# Patient Record
Sex: FEMALE | Race: WHITE
[De-identification: names, ages, dates, MRNs, and addresses within clinical notes are randomized per-mention and may not be internally consistent; named-entity substitution may affect disease eponyms.]

---

## 2020-02-07 NOTE — NUR
PT WAS AN ER ADMIT. PT WAS ADMITTED AT ABOUT 2000. SPOUSE PRESENT AT BEDSIDE
AT THIS THE TIME OF ARRIVAL. PT IS ALERT AND ORIENTED WITH NO SIGN OF DISTRESS
NOTED IN PT. DENIES ANY PAIN. PT IS ON OXYGEN. ADMISSION ASSESSMENT, DATA AND
EDUCATION COMPLETED. PT IS STABLE. PT WANTED CODE STATUS CHANGED FROM A FULL
CODE TO A DNR. NP PRACTITONER NOTIFIED AND STATUS CHANGED. SCHEDULED MEDS
ADMINISTERED TO PATIENT. MONITOR BREATHING AND OXYGENTAION. DENIES ANY FURTHER
NEEDS AT TIME.

## 2020-02-07 NOTE — NUR
PT CARE ASSUMED APPROXIMATELY 0700. PT ASSESSMENTS AS CHARTED. PT DENIES PAIN.
PT MEDICATION AS CHARTED. PT WALKED WITH SPOUSE IN HALLWAY WITHOUT
DIFFICULTY. PT REMAINS ON 5 LPM NC OXYGEN.

## 2020-02-08 NOTE — NUR
ASSESSMENT AS DOCUMENTED.PT BEEN RESTING IN NO ACUTE DISTRESS.A/OX4.VSS.ON O2
AT 5LITERS PNC.MARMOLEJO NOTED WITH ACTIVITIES.IVF.VOIDING ADEQUATELY.IV
ANTIBIOTICS.DENIES PAIN.POC IS TO CONTINUE WITH CURRENT TX.

## 2020-02-08 NOTE — NUR
ASSUMED CARE OF PT AT SHIFT CHANGE. ASSESSMENTS AS CHARTED. MEDS GIVEN PER
MAR. VSS. NO C/O PAIN. PT ON 4L NC WITH NO C/O SOA. PT HAS 1:1 SITTER D/T
SUICICAL COMMENTS MADE TO DR. RUSSELL. DAUGHTER AND  HAVE ALTERNATED AT
BEDSIDE. WILL CONTINUE TO MONITOR.

## 2020-02-09 NOTE — NUR
ASSUMED CARE OF PT AS SHIFT CHANGE. ASSESSMENTS AS CHARTED. MEDS GIVEN PER
MAR. PT A&OX4 NO C/O PAIN OR SOA. ON 1L NC. WALKED IN HALLS WITH NO
DE-SATTING. TAPERING STEROIDS. WILL CONTINUE TO MONITOR AND FOLLOW POC.

## 2020-02-09 NOTE — NUR
2140 HEART RATE INCREASED  THEN AFTER 2 MINS PT CHANGED TO AFIB RVR RATE
144, S ALARAB NP NOTIFED ORDER RCEIVED FRO PO LOPRESSOR. PT /33 DNIES
SOA OR CHEST PAIN , PT REQUESTUNG HER SLEEPING PILL. STAT EKG DONE, SHOWS AFIB
124.

## 2020-02-09 NOTE — NUR
ASSUMED PT CARE AT 1900. VSS. PT A&04. SITTER IN ROOM. ROOM SCRUBED; SI
PRECAUTIONS MAINTAINED. CHANGED PT'S DIET TO A BEHAVIORAL HEALTH TRAY. PSYCH
CONSULT CALLED AND MESSAGE LEFT WITH ANSWERING SERVICE; PROVIDER YET TO SEE
PT. CASE MANAGEMENT CONSULT MADE. PT IN TEARS, STATED "I FEEL LIKE A FAILURE,
BECAUSE I STARTED SMOKING AGAIN AFTER I INITIALLY QUIT" I PROVIDED SMOKING
CEASATION EDUCATION TO PT, AND ALSO, IMPORTANT SELF CARE WHILE GOING THROUGH
THE PROCESS; ALSO WAYS IN WHICH SHE COULD STAY BUSY BY SPENDING MORE TIME
WITH HER GRANDKIDS SINCE PT STATED THEY BRING HER MIGUEL. PT AGREED AND
PROMISED TO WORK ON HER HEALTH JOURNEY.

## 2020-02-10 NOTE — NUR
PT RESTING WELL THROUGHOUT HOURLY ROUNDS HEART RATE 88 NSR  STILL WITH 1:1
SITTER  AND DAUGHTER AT BEDSIDE. WILL CONINTUE WITH CURRENT PLAN OF CARE AND
WILL REPORT CHANGES OR ABNORMAL FINDINGS.

## 2020-02-10 NOTE — 2DMMODE
[address]   [address]                   2 D/M-MODE ECHOCARDIOGRAM     
_______________________________________________________________________________
 
Name:       SAMMIE ROSA          Room #:         213-P       ADM IN  
M.R.#:      [id number]                       Account #:      [id number]  
Admission:  20    Attend Phys:    Luis Eduardo Hernandez MD    
Discharge:              Date of Birth:  [birth year]  
                                                          Report #: [id number]
                                                                    [id number]
_______________________________________________________________________________
THIS REPORT FOR:  
 
cc:  Laura Becerra MD, Paula V. MD Mancuso,Domingo OSEI MD [facility]                                        
                                                                       ~
 
--------------- APPROVED REPORT --------------
 
 
Study performed:  02/10/2020 13:45:02
 
EXAM: Comprehensive 2D, Doppler, and color-flow 
Echocardiogram 
Patient Location: Bedside   
Room #:  213     Status:  routine
 
      BSA:         2.03
HR: 70 bpm BP:          127/57 mmHg 
Rhythm: NSR     
 
Other Information 
Study Quality: Good
 
Indications
COPD
Diabetes
Atrial Fibrillation
Dyspnea 
Hypertension/HDD
 
2D Dimensions
 IVC:  15.00 mm
 
Volumes
Left Atrial Volume (Systole) 
Single Plane 4CH:  61.71 mL Single Plane 2CH:  44.05 mL
    LA ESV Index:  28.00 mL/m2
 
Aortic Valve
AoV Peak Charanjit.:  1.98 m/s 
AO Peak Gr.:  15.60 mmHg LVOT Max P.97 mmHg
    LVOT Max V:  1.12 m/s
 
Mitral Valve
    E/A Ratio:  1.3
 
 
[address] [place]  [address]
Phone:  [phone] 2 D/M-MODE ECHOCARDIOGRAM     
_______________________________________________________________________________
 
Name:            SAMMIE ROSA          Room #:        213-P       [facility] [place]
M.R.#:           [id number]          Account #:     [id number]  
Admission:       20         Attend Phys:   Luis Eduardo Hernandez MD
Discharge:                  Date of Birth: [birth year]  
                         Report #:      [id number]
        [id number]
_______________________________________________________________________________
    MV Decel. Time:  258.60 ms
MV E Max Charanjit.:  1.12 m/s 
MV A Charanjit.:  0.83 m/s  
MV PHT:  74.99 ms  
IVRT:  78.43 ms   
 
Pulmonary Valve
PV Peak Charanjit.:  1.06 m/s PV Peak Gr.:  4.52 mmHg
 
Pulmonary Vein
P Vein S:    0.63 m/s P Vein A:  0.30 m/s
P Vein D:   0.32 m/s P Vein A Dur.:  110.7 msec
P Vein S/D Ratio:  1.97 
 
Tricuspid Valve
TR Peak Charanjit.:  2.24 m/s  
TR Peak Gr.:  20.15 mmHg 
    PA Pressure:  25.00 mmHg
 
Left Ventricle
The left ventricle is normal size. There is normal LV segmental wall 
motion. There is normal left ventricular wall thickness. Left 
ventricular systolic function is hyperdynamic. Mildly increased LV 
gradient with a peak of 22 mmHg. LVEF is >70%. The left 
ventricular diastolic function is normal.
 
Right Ventricle
The right ventricle is normal size. The right ventricular systolic 
function is normal.
 
Atria
The left atrium size is normal. The right atrium size is 
normal.
 
Aortic Valve
The aortic valve is normal in structure. No aortic regurgitation is 
present. There is no aortic valvular stenosis.
 
Mitral Valve
The mitral valve is normal in structure. Trace to mild mitral 
regurgitation. No evidence of mitral valve stenosis.
 
Tricuspid Valve
The tricuspid valve is normal in structure. There is trace tricuspid 
regurgitation. Estimated PAP 25 mmHg. There is no pulmonary 
hypertension.
 
 
[facility]
[address]
[place]  [address]
Phone:  [phone]                    2 D/M-MODE ECHOCARDIOGRAM     
_______________________________________________________________________________
 
Name:            SAMMIE ROSA          Room #:        213-P       ADM IN
M.R.#:           [id number]          Account #:     [id number]  
Admission:       20         Attend Phys:   Luis Eduardo Hernandez MD
Discharge:                  Date of Birth: [birth year]  
                         Report #:      [id number]
        [id number]
_______________________________________________________________________________
 
Pulmonic Valve
The pulmonary valve is normal in structure. There is no pulmonic 
valvular regurgitation.
 
Great Vessels
The aortic root is normal in size. IVC is normal in size and 
collapses >50% with inspiration.
 
Pericardium
There is no pericardial effusion.
 
<Conclusion>
The left ventricle is normal size.
Left ventricular systolic function is hyperdynamic. Mildly increased 
LV gradient with a peak of 22 mmHg.
LVEF is >70%.
The left ventricular diastolic function is normal.
The right ventricle is normal size.
The left atrium size is normal.
The aortic valve is normal in structure.
Trace to mild mitral regurgitation.
There is trace tricuspid regurgitation. Estimated PAP 25 mmHg.
There is no pulmonary hypertension.
The aortic root is normal in size.
There is no pericardial effusion.
 
 
 
 
 
 
 
 
 
 
 
 
 
 
 
 
 
 
  <ELECTRONICALLY SIGNED>
   By: Domingo Cordon MD, FACC   
  02/10/20     144
D: 02/10/20 1441                           _____________________________________
T: 02/10/20 1441                           Domingo Cordon MD, FACC     /INF

## 2020-02-10 NOTE — NUR
ASSESSMENT AS CHARTED. PT ALERT AND ORIENTED. VSS. DENIED HAVING PAIN OR
DISCOMFORT. SEEN BY DR. RUIZ. SHAHRIAR D/C. NO CARDIAC OR RESPIRATORY DISTRESS
NOTED. WILL CONTINUE TO MONITOR.

## 2020-02-10 NOTE — NUR
HEART RATE 94 NSR PT RESTING QUIETLY IN BED 1:1 SITTER AT BEDSIDE WITH
DAUGHTER AT BEDSIDE. WILL CONTINUE TO MONITOR AND REPORT CHANGES.

## 2020-02-11 NOTE — NUR
ASSESSMENT AS CHARTED. PT ALERT AND ORIENTED. VSS. DENIED HAVING PAIN OR
DISCOMFORT. SEEN BY DR. RUSSELL. ORDERS GIVEN TO DISCHARGE PT TO HOME.
DISCHARGE INSTRUCTIONS GIVEN TO PT. PT VERBERLISED UNDERSTANDING. PT LEFT THE
FACILITY ACCOPMANIED BY THE .

## 2020-02-11 NOTE — NUR
ASSUMED PT CARE AROUND 1900. PT WAS IN BATHROOM. PT STEADY ON FEET. PT HAD
ZERO C/O PAIN, N/V/D, SOA, OR CHEST PAIN. PT CONCERNED ABOUT WHEN WILL BE ABLE
TO DISCHARGE HOME. PT  AT BEDSIDE. WILL CONTINUE TO MONITOR PT PER
PHYSICIAN ORDERS.

## 2020-02-12 NOTE — EKG
[facility]
[place]   [address]                     ELECTROCARDIOGRAM REPORT      
_______________________________________________________________________________
 
Name:       SAMMIE ROSA          Room #:         213-P       [facility] [place]  
M.R.#:      [id number]                       Account #:      [id number]  
Admission:  20    Attend Phys:    Luis Eduardo Hernandez MD    
Discharge:  20    Date of Birth:  [birth year]  
                                                          Report #: [id number]
                                                                    [id number]
_______________________________________________________________________________
THIS REPORT FOR:  
 
cc:  Laura Becerra MD,Laura Travis,Ángel CAMPOS MD [facility]
THIS REPORT FOR:   //name//                          
 
                          [facility]
                                       
Test Date:    2020               Test Time:    07:41:12
Pat Name:     SAMMIE ROSA         Department:   
Patient ID:   [id number]            Room:         213 P
Gender:       F                        Technician:   ENZO
:          1955               Requested By: Ángel Travis
Order Number: [id number] MD:   Ángel Travis
                                 Measurements
Intervals                              Axis          
Rate:         54                       P:            68
NV:           167                      QRS:          49
QRSD:         95                       T:            59
QT:           465                                    
QTc:          441                                    
                           Interpretive Statements
Sinus bradycardia
Otherwise normal tracing
Compared to ECG 2020 21:57:14
Atrial fibrillation no longer present
 
Electronically Signed On 2020 9:19:51 CST by Ángel Travis
[email or web address]
 
 
 
 
 
 
 
 
 
 
 
 
 
 
  <ELECTRONICALLY SIGNED>
   By: Ángel Travis MD, [facility]   
  20
D: 20                           _____________________________________
T: 20                           Ánegl Travis MD, FAC     /EPI

## 2020-02-12 NOTE — EKG
[facility]
[place]   [address]                     ELECTROCARDIOGRAM REPORT      
_______________________________________________________________________________
 
Name:       SAMMIE ROSA          Room #:         213-P       [facility] [place]  
M.R.#:      [id number]                       Account #:      [id number]  
Admission:  20    Attend Phys:    Luis Eduardo Hernandez MD    
Discharge:  20    Date of Birth:  [birth year]  
                                                          Report #: [id number]
                                                                    [id number]
_______________________________________________________________________________
THIS REPORT FOR:  
 
cc:  Mariam,Laura Becerra,Laura Travis,Ángel CAMPOS MD [facility]                                        ~
THIS REPORT FOR:   //name//                          
 
                          [facility]
                                       
Test Date:    2020               Test Time:    12:28:54
Pat Name:     SAMMIE ROSA         Department:   
Patient ID:   [id number]            Room:         213 P
Gender:       F                        Technician:   STEVIE DRAPER
:          1955               Requested By: Mili Huber
Order Number: [id number] MD:   Ángel Travis
                                 Measurements
Intervals                              Axis          
Rate:         56                       P:            63
CA:           160                      QRS:          42
QRSD:         88                       T:            54
QT:           465                                    
QTc:          449                                    
                           Interpretive Statements
Sinus bradycardia
Atrial premature complex
Compared to ECG 2020 07:41:12
Atrial premature complex(es) now present
 
Electronically Signed On 2020 17:32:12 CST by Ángel Travis
[email or web address]
 
 
 
 
 
 
 
 
 
 
 
 
 
 
  <ELECTRONICALLY SIGNED>
   By: Ángel Travis MD, FAC   
  20     1732
D: 20 1228                           _____________________________________
T: 20 1228                           Ángel Travis MD, FAC     /EPI

## 2020-02-12 NOTE — EKG
[facility]
[place]   [address]                     ELECTROCARDIOGRAM REPORT      
_______________________________________________________________________________
 
Name:       SAMMIE ROSA          Room #:         213-P       [facility] [place]  
M.R.#:      [id number]                       Account #:      [id number]  
Admission:  20    Attend Phys:    Luis Eduardo Hernandez MD    
Discharge:  20    Date of Birth:  [birth year]  
                                                          Report #: [id number]
                                                                    [id number]
_______________________________________________________________________________
THIS REPORT FOR:  
 
cc:  Mariam,Laura Becerra,Laura Travis,Ángel CAMPOS MD [facility]
THIS REPORT FOR:   //name//                          
 
                          [facility]
                                       
Test Date:    2020               Test Time:    21:57:14
Pat Name:     SAMMIE ROSA         Department:   
Patient ID:   [id number]            Room:         213 P
Gender:       F                        Technician:   jes rodríguez
:          1955               Requested By: Rosi Fairchild
Order Number: [id number] MD:   Ángel Travis
                                 Measurements
Intervals                              Axis          
Rate:         123                      P:            
MO:                                    QRS:          55
QRSD:         75                       T:            267
QT:           316                                    
QTc:          452                                    
                           Interpretive Statements
Atrial fibrillation
Probable LVH with secondary repol abnrm
Compared to ECG 2006 13:32:25
Sinus rhythm no longer present
 
Electronically Signed On 2- 9:38:54 CST by Ángel Travis
[email or web address]
 
 
 
 
 
 
 
 
 
 
 
 
 
 
  <ELECTRONICALLY SIGNED>
   By: Ángel Travis MD, FAC   
  02/10/20     0938
D: 20                           _____________________________________
T: 20                           Ángel Travis MD, [facility]     /EPI

## 2020-03-27 ENCOUNTER — HOSPITAL ENCOUNTER (OUTPATIENT)
Dept: HOSPITAL 35 - SJCVCIMAG | Age: 65
End: 2020-03-27
Attending: INTERNAL MEDICINE
Payer: COMMERCIAL

## 2020-03-27 DIAGNOSIS — J44.9: ICD-10-CM

## 2020-03-27 DIAGNOSIS — I48.0: Primary | ICD-10-CM

## 2020-03-27 DIAGNOSIS — E78.5: ICD-10-CM

## 2020-03-27 DIAGNOSIS — E11.9: ICD-10-CM

## 2020-10-02 ENCOUNTER — HOSPITAL ENCOUNTER (OUTPATIENT)
Dept: HOSPITAL 35 - PAIN | Age: 65
Discharge: HOME | End: 2020-10-02
Payer: COMMERCIAL

## 2020-10-02 VITALS — WEIGHT: 215 LBS | BODY MASS INDEX: 34.55 KG/M2 | HEIGHT: 65.98 IN

## 2020-10-02 VITALS — DIASTOLIC BLOOD PRESSURE: 72 MMHG | SYSTOLIC BLOOD PRESSURE: 124 MMHG

## 2020-10-02 DIAGNOSIS — Z98.890: ICD-10-CM

## 2020-10-02 DIAGNOSIS — Z79.899: ICD-10-CM

## 2020-10-02 DIAGNOSIS — I48.0: ICD-10-CM

## 2020-10-02 DIAGNOSIS — Z79.01: ICD-10-CM

## 2020-10-02 DIAGNOSIS — G89.29: ICD-10-CM

## 2020-10-02 DIAGNOSIS — M54.16: Primary | ICD-10-CM

## 2020-10-02 DIAGNOSIS — Z90.710: ICD-10-CM

## 2020-10-02 DIAGNOSIS — J44.9: ICD-10-CM

## 2020-10-02 NOTE — NUR
Pain Clinic Assessment:
 
1. History of Osteoarthritis:
DENIES
   History of Rheumatoid Arthritis:
DENIES
 
2. Height: 5 ft. 6 in. 167.6 cm.
   Weight: 215.0 lb.  oz. 97.524 kg.
   Patient's BMI: 34.7
 
3. Vital Signs:
   BP: 124/72 Pulse: 71 Resp: 14
   Temp:  02 Sat: 98 ECG Mon:
 
4. Pain Intensity: 3
 
5. Fall Risk:
   Dizziness: N  Needs help standing or walking: N
   Fallen in the last 3 months: N
   Fall risk comments:
 
 
6. Patient on Blood Thinner: None
 
7. History of Hypertension: N
 
8. Opioid Therapy greater than 6 weeks: N
   Opiate Contract Signed:
 
9. Risk Assessment Tool Provided: 4-moderate
 
10. Functional Assessment Tool: 56/70
 
11. Recreational Drug Use: Never Drug Type:
    Tobacco Use: Former Smoker Tobacco Type:
       Amount or Packs/day:  How Many Years:
    Alcohol Use: Yes  Frequency: Weekly Quant: 1-2 A WEEK

## 2020-12-11 ENCOUNTER — HOSPITAL ENCOUNTER (OUTPATIENT)
Dept: HOSPITAL 35 - PAIN | Age: 65
Discharge: HOME | End: 2020-12-11
Payer: COMMERCIAL

## 2020-12-11 VITALS — WEIGHT: 227.4 LBS | BODY MASS INDEX: 36.55 KG/M2 | HEIGHT: 65.98 IN

## 2020-12-11 VITALS — SYSTOLIC BLOOD PRESSURE: 114 MMHG | DIASTOLIC BLOOD PRESSURE: 50 MMHG

## 2020-12-11 DIAGNOSIS — J44.9: ICD-10-CM

## 2020-12-11 DIAGNOSIS — G89.29: ICD-10-CM

## 2020-12-11 DIAGNOSIS — Z87.891: ICD-10-CM

## 2020-12-11 DIAGNOSIS — Z90.710: ICD-10-CM

## 2020-12-11 DIAGNOSIS — M54.16: Primary | ICD-10-CM

## 2020-12-11 DIAGNOSIS — Z98.890: ICD-10-CM

## 2020-12-11 DIAGNOSIS — F32.9: ICD-10-CM

## 2020-12-11 DIAGNOSIS — I48.91: ICD-10-CM

## 2020-12-11 DIAGNOSIS — Z87.19: ICD-10-CM

## 2020-12-11 DIAGNOSIS — Z79.01: ICD-10-CM

## 2020-12-11 DIAGNOSIS — E11.9: ICD-10-CM

## 2020-12-11 DIAGNOSIS — I10: ICD-10-CM

## 2020-12-11 DIAGNOSIS — Z79.899: ICD-10-CM

## 2020-12-11 DIAGNOSIS — E78.00: ICD-10-CM

## 2020-12-11 NOTE — NUR
Pain Clinic Assessment:
 
1. History of Osteoarthritis:
DENIES
   History of Rheumatoid Arthritis:
DENIES
 
2. Height: 5 ft. 6 in. 167.6 cm.
   Weight: 227.4 lb.  oz. 103.148 kg.
   Patient's BMI: 36.7
 
3. Vital Signs:
   BP: 114/50 Pulse: 70 Resp: 16
   Temp:  02 Sat: 96 ECG Mon:
 
4. Pain Intensity: 6
 
5. Fall Risk:
   Dizziness: N  Needs help standing or walking: N
   Fallen in the last 3 months: N
   Fall risk comments:
 
 
6. Patient on Blood Thinner: None
 
7. History of Hypertension: Y
 
8. Opioid Therapy greater than 6 weeks: N
   Opiate Contract Signed:
 
9. Risk Assessment Tool Provided: 4-moderate
 
10. Functional Assessment Tool: 56/70
 
11. Recreational Drug Use: Never Drug Type:
    Tobacco Use: Former Smoker Tobacco Type:
       Amount or Packs/day:  How Many Years:
    Alcohol Use: Yes  Frequency: Special Occasions Quant: 1

## 2021-08-18 ENCOUNTER — HOSPITAL ENCOUNTER (OUTPATIENT)
Dept: HOSPITAL 35 - PAIN | Age: 66
Discharge: HOME | End: 2021-08-18
Payer: COMMERCIAL

## 2021-08-18 VITALS — WEIGHT: 220.2 LBS | HEIGHT: 65.98 IN | BODY MASS INDEX: 35.39 KG/M2

## 2021-08-18 VITALS — SYSTOLIC BLOOD PRESSURE: 131 MMHG | DIASTOLIC BLOOD PRESSURE: 75 MMHG

## 2021-08-18 DIAGNOSIS — Z87.891: ICD-10-CM

## 2021-08-18 DIAGNOSIS — G89.29: ICD-10-CM

## 2021-08-18 DIAGNOSIS — Z79.899: ICD-10-CM

## 2021-08-18 DIAGNOSIS — Z98.890: ICD-10-CM

## 2021-08-18 DIAGNOSIS — F32.9: ICD-10-CM

## 2021-08-18 DIAGNOSIS — E66.9: ICD-10-CM

## 2021-08-18 DIAGNOSIS — M54.16: Primary | ICD-10-CM

## 2021-08-18 DIAGNOSIS — I48.0: ICD-10-CM

## 2021-08-18 NOTE — NUR
Pain Clinic Assessment:
 
1. History of Osteoarthritis:
DENIES
   History of Rheumatoid Arthritis:
DENIES
 
2. Height: 5 ft. 6 in. 167.6 cm.
   Weight: 220.2 lb.  oz. 99.882 kg.
   Patient's BMI: 35.6
 
3. Vital Signs:
   BP: 131/75 Pulse: 58 Resp: 16
   Temp:  02 Sat: 97 ECG Mon:
 
4. Pain Intensity: 8 W/ACTIVITY
 
5. Fall Risk:
   Dizziness: N  Needs help standing or walking: N
   Fallen in the last 3 months: Y
   Fall risk comments:
SLIPPED AND FELL BACKWARD-SLIPPED ON BOAT DOCK/HIT BACK OF
HEAD
 
6. Patient on Blood Thinner: None
 
7. History of Hypertension: Y
 
8. Opioid Therapy greater than 6 weeks: N
   Opiate Contract Signed:
 
9. Risk Assessment Tool Provided: 4-moderate
 
10. Functional Assessment Tool: 56/70
 
11. Recreational Drug Use: Never Drug Type:
    Tobacco Use: Former Smoker Tobacco Type:
       Amount or Packs/day:  How Many Years:
    Alcohol Use: Yes  Frequency:  Quant:

## 2021-08-24 ENCOUNTER — HOSPITAL ENCOUNTER (OUTPATIENT)
Dept: HOSPITAL 35 - CAT | Age: 66
End: 2021-08-24
Attending: INTERNAL MEDICINE
Payer: COMMERCIAL

## 2021-08-24 DIAGNOSIS — Z87.891: ICD-10-CM

## 2021-08-24 DIAGNOSIS — K44.9: Primary | ICD-10-CM

## 2021-08-24 DIAGNOSIS — I70.0: ICD-10-CM

## 2021-08-24 DIAGNOSIS — Z12.2: ICD-10-CM

## 2021-09-29 ENCOUNTER — HOSPITAL ENCOUNTER (OUTPATIENT)
Dept: HOSPITAL 35 - SJCVC | Age: 66
End: 2021-09-29
Attending: INTERNAL MEDICINE
Payer: COMMERCIAL

## 2021-09-29 DIAGNOSIS — Z87.891: ICD-10-CM

## 2021-09-29 DIAGNOSIS — Z79.82: ICD-10-CM

## 2021-09-29 DIAGNOSIS — J96.01: ICD-10-CM

## 2021-09-29 DIAGNOSIS — E78.5: ICD-10-CM

## 2021-09-29 DIAGNOSIS — Z79.899: ICD-10-CM

## 2021-09-29 DIAGNOSIS — I48.0: Primary | ICD-10-CM

## 2021-09-29 DIAGNOSIS — Z82.49: ICD-10-CM

## 2021-09-29 DIAGNOSIS — J18.9: ICD-10-CM

## 2021-09-29 DIAGNOSIS — I10: ICD-10-CM

## 2021-09-29 DIAGNOSIS — J43.9: ICD-10-CM

## 2021-09-29 DIAGNOSIS — E11.9: ICD-10-CM

## 2021-09-29 DIAGNOSIS — F32.9: ICD-10-CM

## 2022-01-14 ENCOUNTER — HOSPITAL ENCOUNTER (OUTPATIENT)
Dept: HOSPITAL 35 - PAIN | Age: 67
Discharge: HOME | End: 2022-01-14
Payer: COMMERCIAL

## 2022-01-14 VITALS — BODY MASS INDEX: 35.2 KG/M2 | WEIGHT: 219 LBS | HEIGHT: 65.98 IN

## 2022-01-14 VITALS — DIASTOLIC BLOOD PRESSURE: 54 MMHG | SYSTOLIC BLOOD PRESSURE: 123 MMHG

## 2022-01-14 DIAGNOSIS — Z98.890: ICD-10-CM

## 2022-01-14 DIAGNOSIS — Z87.891: ICD-10-CM

## 2022-01-14 DIAGNOSIS — Z79.899: ICD-10-CM

## 2022-01-14 DIAGNOSIS — M54.16: Primary | ICD-10-CM

## 2022-01-14 DIAGNOSIS — G89.29: ICD-10-CM

## 2022-01-14 NOTE — NUR
Pain Clinic Assessment:
 
1. History of Osteoarthritis:
DENIES
   History of Rheumatoid Arthritis:
DENIES
 
2. Height: 5 ft. 6 in. 167.6 cm.
   Weight: 219.0 lb.  oz. 99.338 kg.
   Patient's BMI: 35.4
 
3. Vital Signs:
   BP: 123/54 Pulse: 72 Resp: 16
   Temp:  02 Sat: 95 ECG Mon:
 
4. Pain Intensity: 8 W/ACTIVITY
 
5. Fall Risk:
   Dizziness: N  Needs help standing or walking: N
   Fallen in the last 3 months: Y
   Fall risk comments:
SLIPPED AND FELL BACKWARD-SLIPPED ON BOAT DOCK/HIT BACK OF
HEAD
 
6. Patient on Blood Thinner: None
 
7. History of Hypertension: Y
 
8. Opioid Therapy greater than 6 weeks: N
   Opiate Contract Signed:
 
9. Risk Assessment Tool Provided: 4-moderate
 
10. Functional Assessment Tool: 56/70
 
11. Recreational Drug Use: Never Drug Type:
    Tobacco Use: Former Smoker Tobacco Type:
       Amount or Packs/day:  How Many Years:
    Alcohol Use: Yes  Frequency:  Quant: